# Patient Record
Sex: MALE | Race: WHITE
[De-identification: names, ages, dates, MRNs, and addresses within clinical notes are randomized per-mention and may not be internally consistent; named-entity substitution may affect disease eponyms.]

---

## 2018-08-09 ENCOUNTER — HOSPITAL ENCOUNTER (INPATIENT)
Dept: HOSPITAL 15 - ER | Age: 53
LOS: 1 days | Discharge: HOME | DRG: 246 | End: 2018-08-10
Attending: INTERNAL MEDICINE | Admitting: INTERNAL MEDICINE
Payer: COMMERCIAL

## 2018-08-09 VITALS — DIASTOLIC BLOOD PRESSURE: 90 MMHG | SYSTOLIC BLOOD PRESSURE: 141 MMHG

## 2018-08-09 VITALS — HEIGHT: 68 IN | WEIGHT: 233.91 LBS | BODY MASS INDEX: 35.45 KG/M2

## 2018-08-09 VITALS — SYSTOLIC BLOOD PRESSURE: 144 MMHG | DIASTOLIC BLOOD PRESSURE: 80 MMHG

## 2018-08-09 VITALS — SYSTOLIC BLOOD PRESSURE: 140 MMHG | DIASTOLIC BLOOD PRESSURE: 81 MMHG

## 2018-08-09 DIAGNOSIS — E11.9: ICD-10-CM

## 2018-08-09 DIAGNOSIS — I21.4: Primary | ICD-10-CM

## 2018-08-09 DIAGNOSIS — Z87.442: ICD-10-CM

## 2018-08-09 DIAGNOSIS — E78.1: ICD-10-CM

## 2018-08-09 DIAGNOSIS — I50.31: ICD-10-CM

## 2018-08-09 DIAGNOSIS — Z79.899: ICD-10-CM

## 2018-08-09 DIAGNOSIS — Z82.5: ICD-10-CM

## 2018-08-09 DIAGNOSIS — I25.10: ICD-10-CM

## 2018-08-09 DIAGNOSIS — Z79.82: ICD-10-CM

## 2018-08-09 DIAGNOSIS — Z87.891: ICD-10-CM

## 2018-08-09 DIAGNOSIS — E66.9: ICD-10-CM

## 2018-08-09 DIAGNOSIS — I11.0: ICD-10-CM

## 2018-08-09 DIAGNOSIS — Z82.49: ICD-10-CM

## 2018-08-09 LAB
ALBUMIN SERPL-MCNC: 3.6 G/DL (ref 3.4–5)
ALP SERPL-CCNC: 52 U/L (ref 45–117)
ALT SERPL-CCNC: 34 U/L (ref 16–61)
ANION GAP SERPL CALCULATED.3IONS-SCNC: 13 MMOL/L (ref 5–15)
APTT PPP: 28.9 SEC (ref 23.78–33.04)
BILIRUB SERPL-MCNC: 0.5 MG/DL (ref 0.2–1)
BUN SERPL-MCNC: 13 MG/DL (ref 7–18)
BUN/CREAT SERPL: 12
CALCIUM SERPL-MCNC: 8.7 MG/DL (ref 8.5–10.1)
CHLORIDE SERPL-SCNC: 107 MMOL/L (ref 98–107)
CO2 SERPL-SCNC: 21 MMOL/L (ref 21–32)
GLUCOSE SERPL-MCNC: 114 MG/DL (ref 74–106)
HCT VFR BLD AUTO: 51.5 % (ref 41–53)
HGB BLD-MCNC: 17.5 G/DL (ref 13.5–17.5)
INR PPP: 0.95 (ref 0.9–1.15)
MAGNESIUM SERPL-MCNC: 2.3 MG/DL (ref 1.6–2.6)
MCH RBC QN AUTO: 29.1 PG (ref 28–32)
MCV RBC AUTO: 85.8 FL (ref 80–100)
NRBC BLD QL AUTO: 0.2 %
POTASSIUM SERPL-SCNC: 3.7 MMOL/L (ref 3.5–5.1)
PROT SERPL-MCNC: 7.3 G/DL (ref 6.4–8.2)
PROTHROMBIN TIME: 10.2 SEC (ref 9.27–12.13)
SODIUM SERPL-SCNC: 141 MMOL/L (ref 136–145)

## 2018-08-09 PROCEDURE — 93458 L HRT ARTERY/VENTRICLE ANGIO: CPT

## 2018-08-09 PROCEDURE — 85652 RBC SED RATE AUTOMATED: CPT

## 2018-08-09 PROCEDURE — 93005 ELECTROCARDIOGRAM TRACING: CPT

## 2018-08-09 PROCEDURE — 85610 PROTHROMBIN TIME: CPT

## 2018-08-09 PROCEDURE — 82962 GLUCOSE BLOOD TEST: CPT

## 2018-08-09 PROCEDURE — 92928 PRQ TCAT PLMT NTRAC ST 1 LES: CPT

## 2018-08-09 PROCEDURE — 4A023N7 MEASUREMENT OF CARDIAC SAMPLING AND PRESSURE, LEFT HEART, PERCUTANEOUS APPROACH: ICD-10-PCS | Performed by: INTERNAL MEDICINE

## 2018-08-09 PROCEDURE — 36415 COLL VENOUS BLD VENIPUNCTURE: CPT

## 2018-08-09 PROCEDURE — 75710 ARTERY X-RAYS ARM/LEG: CPT

## 2018-08-09 PROCEDURE — 80061 LIPID PANEL: CPT

## 2018-08-09 PROCEDURE — 81001 URINALYSIS AUTO W/SCOPE: CPT

## 2018-08-09 PROCEDURE — 96375 TX/PRO/DX INJ NEW DRUG ADDON: CPT

## 2018-08-09 PROCEDURE — 83735 ASSAY OF MAGNESIUM: CPT

## 2018-08-09 PROCEDURE — 85025 COMPLETE CBC W/AUTO DIFF WBC: CPT

## 2018-08-09 PROCEDURE — 84443 ASSAY THYROID STIM HORMONE: CPT

## 2018-08-09 PROCEDURE — 84484 ASSAY OF TROPONIN QUANT: CPT

## 2018-08-09 PROCEDURE — 75736 ARTERY X-RAYS PELVIS: CPT

## 2018-08-09 PROCEDURE — B41J1ZZ FLUOROSCOPY OF OTHER LOWER ARTERIES USING LOW OSMOLAR CONTRAST: ICD-10-PCS | Performed by: INTERNAL MEDICINE

## 2018-08-09 PROCEDURE — 80053 COMPREHEN METABOLIC PANEL: CPT

## 2018-08-09 PROCEDURE — 85730 THROMBOPLASTIN TIME PARTIAL: CPT

## 2018-08-09 PROCEDURE — 83880 ASSAY OF NATRIURETIC PEPTIDE: CPT

## 2018-08-09 PROCEDURE — 99152 MOD SED SAME PHYS/QHP 5/>YRS: CPT

## 2018-08-09 PROCEDURE — 027034Z DILATION OF CORONARY ARTERY, ONE ARTERY WITH DRUG-ELUTING INTRALUMINAL DEVICE, PERCUTANEOUS APPROACH: ICD-10-PCS | Performed by: INTERNAL MEDICINE

## 2018-08-09 PROCEDURE — B2111ZZ FLUOROSCOPY OF MULTIPLE CORONARY ARTERIES USING LOW OSMOLAR CONTRAST: ICD-10-PCS | Performed by: INTERNAL MEDICINE

## 2018-08-09 PROCEDURE — 96374 THER/PROPH/DIAG INJ IV PUSH: CPT

## 2018-08-09 PROCEDURE — 71045 X-RAY EXAM CHEST 1 VIEW: CPT

## 2018-08-09 PROCEDURE — 86141 C-REACTIVE PROTEIN HS: CPT

## 2018-08-09 PROCEDURE — 83036 HEMOGLOBIN GLYCOSYLATED A1C: CPT

## 2018-08-09 PROCEDURE — 82550 ASSAY OF CK (CPK): CPT

## 2018-08-09 RX ADMIN — HUMAN INSULIN SCH UNITS: 100 INJECTION, SOLUTION SUBCUTANEOUS at 17:00

## 2018-08-09 RX ADMIN — SODIUM CHLORIDE SCH MLS/HR: 0.9 INJECTION, SOLUTION INTRAVENOUS at 21:42

## 2018-08-09 RX ADMIN — HUMAN INSULIN SCH UNITS: 100 INJECTION, SOLUTION SUBCUTANEOUS at 11:30

## 2018-08-09 RX ADMIN — PANTOPRAZOLE SODIUM SCH MG: 40 TABLET, DELAYED RELEASE ORAL at 09:09

## 2018-08-09 RX ADMIN — METOPROLOL TARTRATE SCH MG: 25 TABLET, FILM COATED ORAL at 21:41

## 2018-08-09 RX ADMIN — HUMAN INSULIN SCH UNITS: 100 INJECTION, SOLUTION SUBCUTANEOUS at 21:42

## 2018-08-09 RX ADMIN — ATORVASTATIN CALCIUM SCH MG: 20 TABLET, FILM COATED ORAL at 21:41

## 2018-08-09 RX ADMIN — SODIUM CHLORIDE SCH MLS/HR: 0.9 INJECTION, SOLUTION INTRAVENOUS at 09:06

## 2018-08-09 RX ADMIN — Medication SCH STRIP: at 18:00

## 2018-08-09 RX ADMIN — ATORVASTATIN CALCIUM SCH MG: 20 TABLET, FILM COATED ORAL at 09:06

## 2018-08-09 RX ADMIN — METOPROLOL TARTRATE SCH MG: 25 TABLET, FILM COATED ORAL at 09:06

## 2018-08-09 RX ADMIN — Medication SCH STRIP: at 21:42

## 2018-08-09 RX ADMIN — Medication SCH STRIP: at 11:43

## 2018-08-10 VITALS — SYSTOLIC BLOOD PRESSURE: 130 MMHG | DIASTOLIC BLOOD PRESSURE: 90 MMHG

## 2018-08-10 VITALS — SYSTOLIC BLOOD PRESSURE: 128 MMHG | DIASTOLIC BLOOD PRESSURE: 92 MMHG

## 2018-08-10 VITALS — DIASTOLIC BLOOD PRESSURE: 79 MMHG | SYSTOLIC BLOOD PRESSURE: 123 MMHG

## 2018-08-10 VITALS — DIASTOLIC BLOOD PRESSURE: 90 MMHG | SYSTOLIC BLOOD PRESSURE: 136 MMHG

## 2018-08-10 LAB
CHOLEST SERPL-MCNC: 145 MG/DL (ref ?–200)
HDLC SERPL-MCNC: 35 MG/DL (ref 40–59)
TRIGL SERPL-MCNC: 128 MG/DL (ref ?–150)

## 2018-08-10 RX ADMIN — Medication SCH STRIP: at 06:24

## 2018-08-10 RX ADMIN — HUMAN INSULIN SCH UNITS: 100 INJECTION, SOLUTION SUBCUTANEOUS at 11:45

## 2018-08-10 RX ADMIN — Medication SCH STRIP: at 13:21

## 2018-08-10 RX ADMIN — HUMAN INSULIN SCH UNITS: 100 INJECTION, SOLUTION SUBCUTANEOUS at 06:24

## 2018-08-10 RX ADMIN — METOPROLOL TARTRATE SCH MG: 25 TABLET, FILM COATED ORAL at 09:40

## 2018-08-10 RX ADMIN — PANTOPRAZOLE SODIUM SCH MG: 40 TABLET, DELAYED RELEASE ORAL at 09:40

## 2018-08-10 RX ADMIN — SODIUM CHLORIDE SCH MLS/HR: 0.9 INJECTION, SOLUTION INTRAVENOUS at 13:22

## 2019-11-29 ENCOUNTER — HOSPITAL ENCOUNTER (INPATIENT)
Dept: HOSPITAL 15 - ER | Age: 54
LOS: 3 days | Discharge: HOME | DRG: 246 | End: 2019-12-02
Attending: INTERNAL MEDICINE | Admitting: HOSPITALIST
Payer: COMMERCIAL

## 2019-11-29 VITALS — BODY MASS INDEX: 32.17 KG/M2 | HEIGHT: 73 IN | WEIGHT: 242.73 LBS

## 2019-11-29 DIAGNOSIS — Z87.442: ICD-10-CM

## 2019-11-29 DIAGNOSIS — Z79.899: ICD-10-CM

## 2019-11-29 DIAGNOSIS — I16.1: ICD-10-CM

## 2019-11-29 DIAGNOSIS — I25.10: ICD-10-CM

## 2019-11-29 DIAGNOSIS — I25.2: ICD-10-CM

## 2019-11-29 DIAGNOSIS — I21.4: Primary | ICD-10-CM

## 2019-11-29 DIAGNOSIS — Z82.49: ICD-10-CM

## 2019-11-29 DIAGNOSIS — N18.9: ICD-10-CM

## 2019-11-29 DIAGNOSIS — Z95.5: ICD-10-CM

## 2019-11-29 DIAGNOSIS — D75.1: ICD-10-CM

## 2019-11-29 DIAGNOSIS — I12.9: ICD-10-CM

## 2019-11-29 DIAGNOSIS — N17.0: ICD-10-CM

## 2019-11-29 DIAGNOSIS — E78.5: ICD-10-CM

## 2019-11-29 DIAGNOSIS — Z79.02: ICD-10-CM

## 2019-11-29 LAB
ALBUMIN SERPL-MCNC: 4.2 G/DL (ref 3.4–5)
ALP SERPL-CCNC: 73 U/L (ref 45–117)
ALT SERPL-CCNC: 57 U/L (ref 16–61)
ANION GAP SERPL CALCULATED.3IONS-SCNC: 6 MMOL/L (ref 5–15)
BILIRUB SERPL-MCNC: 0.5 MG/DL (ref 0.2–1)
BUN SERPL-MCNC: 21 MG/DL (ref 7–18)
BUN/CREAT SERPL: 21.4
CALCIUM SERPL-MCNC: 8.7 MG/DL (ref 8.5–10.1)
CHLORIDE SERPL-SCNC: 109 MMOL/L (ref 98–107)
CO2 SERPL-SCNC: 24 MMOL/L (ref 21–32)
GLUCOSE SERPL-MCNC: 98 MG/DL (ref 74–106)
HCT VFR BLD AUTO: 50.5 % (ref 41–53)
HGB BLD-MCNC: 17.8 G/DL (ref 13.5–17.5)
MCH RBC QN AUTO: 29.7 PG (ref 28–32)
MCV RBC AUTO: 84.3 FL (ref 80–100)
NRBC BLD QL AUTO: 0.2 %
POTASSIUM SERPL-SCNC: 3.9 MMOL/L (ref 3.5–5.1)
PROT SERPL-MCNC: 7.9 G/DL (ref 6.4–8.2)
SODIUM SERPL-SCNC: 139 MMOL/L (ref 136–145)

## 2019-11-29 PROCEDURE — 84484 ASSAY OF TROPONIN QUANT: CPT

## 2019-11-29 PROCEDURE — 83036 HEMOGLOBIN GLYCOSYLATED A1C: CPT

## 2019-11-29 PROCEDURE — 80061 LIPID PANEL: CPT

## 2019-11-29 PROCEDURE — 80053 COMPREHEN METABOLIC PANEL: CPT

## 2019-11-29 PROCEDURE — 71046 X-RAY EXAM CHEST 2 VIEWS: CPT

## 2019-11-29 PROCEDURE — 93005 ELECTROCARDIOGRAM TRACING: CPT

## 2019-11-29 PROCEDURE — 36415 COLL VENOUS BLD VENIPUNCTURE: CPT

## 2019-11-29 PROCEDURE — 85025 COMPLETE CBC W/AUTO DIFF WBC: CPT

## 2019-11-30 RX ADMIN — ENOXAPARIN SODIUM SCH MG: 100 INJECTION SUBCUTANEOUS at 16:04

## 2019-11-30 RX ADMIN — PANTOPRAZOLE SODIUM SCH MG: 40 TABLET, DELAYED RELEASE ORAL at 10:23

## 2019-11-30 RX ADMIN — METOPROLOL TARTRATE SCH MG: 25 TABLET, FILM COATED ORAL at 10:26

## 2019-11-30 RX ADMIN — METOPROLOL TARTRATE SCH MG: 25 TABLET, FILM COATED ORAL at 22:29

## 2019-11-30 RX ADMIN — ENOXAPARIN SODIUM SCH MG: 100 INJECTION SUBCUTANEOUS at 22:30

## 2019-12-01 VITALS — DIASTOLIC BLOOD PRESSURE: 83 MMHG | SYSTOLIC BLOOD PRESSURE: 145 MMHG

## 2019-12-01 LAB
ALBUMIN SERPL-MCNC: 3.7 G/DL (ref 3.4–5)
ALP SERPL-CCNC: 73 U/L (ref 45–117)
ALT SERPL-CCNC: 47 U/L (ref 16–61)
ANION GAP SERPL CALCULATED.3IONS-SCNC: 6 MMOL/L (ref 5–15)
BILIRUB SERPL-MCNC: 0.6 MG/DL (ref 0.2–1)
BUN SERPL-MCNC: 19 MG/DL (ref 7–18)
BUN/CREAT SERPL: 17.8
CALCIUM SERPL-MCNC: 9.1 MG/DL (ref 8.5–10.1)
CHLORIDE SERPL-SCNC: 108 MMOL/L (ref 98–107)
CHOLEST SERPL-MCNC: 226 MG/DL (ref ?–200)
CO2 SERPL-SCNC: 25 MMOL/L (ref 21–32)
GLUCOSE SERPL-MCNC: 114 MG/DL (ref 74–106)
HCT VFR BLD AUTO: 52.7 % (ref 41–53)
HDLC SERPL-MCNC: 42 MG/DL (ref 40–59)
HGB BLD-MCNC: 17.8 G/DL (ref 13.5–17.5)
MCH RBC QN AUTO: 29.4 PG (ref 28–32)
MCV RBC AUTO: 87.1 FL (ref 80–100)
NRBC BLD QL AUTO: 0.2 %
POTASSIUM SERPL-SCNC: 4.6 MMOL/L (ref 3.5–5.1)
PROT SERPL-MCNC: 7.3 G/DL (ref 6.4–8.2)
SODIUM SERPL-SCNC: 139 MMOL/L (ref 136–145)
TRIGL SERPL-MCNC: 221 MG/DL (ref ?–150)

## 2019-12-01 PROCEDURE — B41C1ZZ FLUOROSCOPY OF PELVIC ARTERIES USING LOW OSMOLAR CONTRAST: ICD-10-PCS | Performed by: INTERNAL MEDICINE

## 2019-12-01 PROCEDURE — 4A033BC MEASUREMENT OF ARTERIAL PRESSURE, CORONARY, PERCUTANEOUS APPROACH: ICD-10-PCS | Performed by: INTERNAL MEDICINE

## 2019-12-01 PROCEDURE — B2111ZZ FLUOROSCOPY OF MULTIPLE CORONARY ARTERIES USING LOW OSMOLAR CONTRAST: ICD-10-PCS | Performed by: INTERNAL MEDICINE

## 2019-12-01 PROCEDURE — 027135Z DILATION OF CORONARY ARTERY, TWO ARTERIES WITH TWO DRUG-ELUTING INTRALUMINAL DEVICES, PERCUTANEOUS APPROACH: ICD-10-PCS | Performed by: INTERNAL MEDICINE

## 2019-12-01 PROCEDURE — B240ZZ3 ULTRASONOGRAPHY OF SINGLE CORONARY ARTERY, INTRAVASCULAR: ICD-10-PCS | Performed by: INTERNAL MEDICINE

## 2019-12-01 RX ADMIN — CLOPIDOGREL BISULFATE SCH MG: 75 TABLET ORAL at 09:40

## 2019-12-01 RX ADMIN — PANTOPRAZOLE SODIUM SCH MG: 40 TABLET, DELAYED RELEASE ORAL at 09:36

## 2019-12-01 RX ADMIN — METOPROLOL TARTRATE SCH MG: 25 TABLET, FILM COATED ORAL at 09:40

## 2019-12-01 RX ADMIN — METOPROLOL TARTRATE SCH MG: 25 TABLET, FILM COATED ORAL at 22:06

## 2019-12-01 RX ADMIN — ENOXAPARIN SODIUM SCH MG: 100 INJECTION SUBCUTANEOUS at 09:34

## 2019-12-01 NOTE — NUR
TELE ADMIT FROM CATH LAB

Patient brought to bed 251B following left Cardiac catheterization. Patient  connected to 
telemetry monitor #5.  Catheterization site assessed for any bleeding, redness or swelling. 
Safeguard device in place.  Pedal pulses on affected leg assessed for positive tissue 
perfusion. Patient instructed on need to notify staff immediately if any pain, burning or 
wetness to site, and any lower back pain. Patient educated on new cardiac medications. Bed 
in lowest, locked position with side rails up x2 and call light within reach. All questions 
and concerns addressed, patient verbalized understanding of all education and 
instruction.Will continue to monitor Q1hr/PRN.

## 2019-12-01 NOTE — NUR
Safeguard removed



2010- No drainage noted from right groin. 5ML air removed.

2110- No drainage noted from right groin. 10ML air removed.

2230- No drainage noted from right groin. 10ML air removed.

2350- No drainage noted from right groin. 10ML air removed. Safeguard completely deflated 
and removed at this point. Site covered with gauze and Tegaderm.

## 2019-12-02 VITALS — DIASTOLIC BLOOD PRESSURE: 81 MMHG | SYSTOLIC BLOOD PRESSURE: 130 MMHG

## 2019-12-02 VITALS — SYSTOLIC BLOOD PRESSURE: 132 MMHG | DIASTOLIC BLOOD PRESSURE: 84 MMHG

## 2019-12-02 VITALS — SYSTOLIC BLOOD PRESSURE: 139 MMHG | DIASTOLIC BLOOD PRESSURE: 80 MMHG

## 2019-12-02 VITALS — DIASTOLIC BLOOD PRESSURE: 80 MMHG | SYSTOLIC BLOOD PRESSURE: 139 MMHG

## 2019-12-02 RX ADMIN — PANTOPRAZOLE SODIUM SCH MG: 40 TABLET, DELAYED RELEASE ORAL at 09:28

## 2019-12-02 RX ADMIN — CLOPIDOGREL BISULFATE SCH MG: 75 TABLET ORAL at 09:29

## 2019-12-02 RX ADMIN — METOPROLOL TARTRATE SCH MG: 25 TABLET, FILM COATED ORAL at 09:28

## 2019-12-02 NOTE — NUR
Discharged

Discharge instructions given as ordered. Encourage to follow up with PMD and Cardiologist as 
instructed. All questions and concerns addressed. Patient verbalized understanding.  
Medication reconciliation form completed and copy given to patient. New prescriptions given 
to patient. IVs removed with catheters intact, pressure dressings applied.  Telemetry unit 
returned to ICU. Patient taken to vehicle with all personal belongings, accompanied by 
family member. No distress noted at time of departure.

## 2019-12-02 NOTE — NUR
Open Shift Note

Received report on patient, awake and sitting up in bed. Patient shows no signs of distress 
at this time Discussed POC with patient, patient states "I'm going home today". Bed in 
lowest locked position, side rails up x2 and call light within reach. Will continue to 
monitor.

## 2019-12-05 ENCOUNTER — HOSPITAL ENCOUNTER (EMERGENCY)
Dept: HOSPITAL 15 - ER | Age: 54
Discharge: HOME | End: 2019-12-05
Payer: COMMERCIAL

## 2019-12-05 VITALS — SYSTOLIC BLOOD PRESSURE: 139 MMHG | DIASTOLIC BLOOD PRESSURE: 78 MMHG

## 2019-12-05 VITALS — HEIGHT: 68 IN | BODY MASS INDEX: 36.37 KG/M2 | WEIGHT: 240 LBS

## 2019-12-05 DIAGNOSIS — R47.81: ICD-10-CM

## 2019-12-05 DIAGNOSIS — M79.9: ICD-10-CM

## 2019-12-05 DIAGNOSIS — R29.810: Primary | ICD-10-CM

## 2019-12-05 DIAGNOSIS — Z79.899: ICD-10-CM

## 2019-12-05 PROCEDURE — 70450 CT HEAD/BRAIN W/O DYE: CPT

## 2019-12-05 PROCEDURE — 93005 ELECTROCARDIOGRAM TRACING: CPT

## 2023-01-11 NOTE — NUR
Anticoagulation Clinic Progress Note    Anticoagulation Summary  As of 2023    INR goal:  2.0-3.0   TTR:  72.8 % (4.3 y)   INR used for dosin.10 (2023)   Warfarin maintenance plan:  1.25 mg every Sun, Tue, Thu; 2.5 mg all other days; Starting 2023   Weekly warfarin total:  13.75 mg   No change documented:  Jayant White, PharmD   Plan last modified:  Elise Davis RPH (2022)   Next INR check:  2023   Priority:  Maintenance   Target end date:  Indefinite    Indications    Long-term (current) use of anticoagulants (Resolved) [Z79.01]             Anticoagulation Episode Summary     INR check location:      Preferred lab:      Send INR reminders to:   [facility]    Comments:  [facility] --- SPEAK WITH DAUGHTER (LYNETTE) **DO NOT SPEAK WITH PATIENT (poor memory)** **CALL EVERY TIME**      Anticoagulation Care Providers     Provider Role Specialty Phone number    Rafaela Leija MD Referring Cardiology [phone]          Clinic Interview:  Patient Findings     Negatives:  Signs/symptoms of thrombosis, Signs/symptoms of bleeding,   Laboratory test error suspected, Change in health, Change in alcohol use,   Change in activity, Upcoming invasive procedure, Emergency department   visit, Upcoming dental procedure, Missed doses, Extra doses, Change in   medications, Change in diet/appetite, Hospital admission, Bruising, Other   complaints      Clinical Outcomes     Negatives:  Major bleeding event, Thromboembolic event,   Anticoagulation-related hospital admission, Anticoagulation-related ED   visit, Anticoagulation-related fatality        INR History:  Anticoagulation Monitoring 2022   INR 2.60 2.10 2.10   INR Date 2022   INR Goal 2.0-3.0 2.0-3.0 2.0-3.0   Trend Same Same Same   Last Week Total 13.75 mg 13.75 mg 13.75 mg   Next Week Total 13.75 mg 13.75 mg 13.75 mg   Sun 1.25 mg 1.25 mg 1.25 mg   Mon 2.5 mg 2.5 mg  Opening Shift Note

Assumed care of patient, awake and alert.  No S/S of distress/SOB or pain. Instructed on POC 
and to call for assist if needed. Pt is laying in bed with the rails up x2, bed is locked in 
the lowest position and call light is within reach. Safeguard dressing is in place over 
right groin. No drainage noted. Strong pedal pulses palpated. Pt states that he has no 
numbness or tingling. Will continue to monitor. 2.5 mg   Tue 1.25 mg 1.25 mg 1.25 mg   Wed 2.5 mg 2.5 mg 2.5 mg   Thu 1.25 mg 1.25 mg 1.25 mg   Fri 2.5 mg 2.5 mg 2.5 mg   Sat 2.5 mg 2.5 mg 2.5 mg   Visit Report - - -   Some recent data might be hidden       Plan:  1. INR is Therapeutic today- see above in Anticoagulation Summary.   Will instruct Diana Avalos to Continue their warfarin regimen- see above in Anticoagulation Summary.  2. Follow up in 1 week  3. Spoke with daughter, Magui. They have been instructed to call if any changes in medications, doses, concerns, etc. Patient expresses understanding and has no further questions at this time.    Jayant White, PharmD